# Patient Record
Sex: MALE | URBAN - METROPOLITAN AREA
[De-identification: names, ages, dates, MRNs, and addresses within clinical notes are randomized per-mention and may not be internally consistent; named-entity substitution may affect disease eponyms.]

---

## 2023-05-21 ENCOUNTER — EMERGENCY (EMERGENCY)
Facility: HOSPITAL | Age: 18
LOS: 1 days | Discharge: HOME | End: 2023-05-21
Admitting: EMERGENCY MEDICINE
Payer: MEDICAID

## 2023-05-21 VITALS
SYSTOLIC BLOOD PRESSURE: 138 MMHG | HEART RATE: 80 BPM | OXYGEN SATURATION: 98 % | RESPIRATION RATE: 20 BRPM | TEMPERATURE: 98 F | DIASTOLIC BLOOD PRESSURE: 74 MMHG | HEIGHT: 74 IN | WEIGHT: 220.02 LBS

## 2023-05-21 DIAGNOSIS — K08.89 OTHER SPECIFIED DISORDERS OF TEETH AND SUPPORTING STRUCTURES: ICD-10-CM

## 2023-05-21 PROCEDURE — 99283 EMERGENCY DEPT VISIT LOW MDM: CPT

## 2023-05-21 NOTE — ED ADULT NURSE NOTE - NSFALLUNIVINTERV_ED_ALL_ED
Bed/Stretcher in lowest position, wheels locked, appropriate side rails in place/Call bell, personal items and telephone in reach/Instruct patient to call for assistance before getting out of bed/chair/stretcher/Non-slip footwear applied when patient is off stretcher/[place] to call system/Physically safe environment - no spills, clutter or unnecessary equipment/Purposeful proactive rounding/Room/bathroom lighting operational, light cord in reach

## 2023-05-21 NOTE — ED ADULT TRIAGE NOTE - CHIEF COMPLAINT QUOTE
Pt walked into ER c/o dental pain for the past year worsening a few months ago. Pt reports pain when eating or drinking. No further associated complaints at triage.

## 2023-05-21 NOTE — ED ADULT NURSE NOTE - NSSEPSISSUSPECTED_ED_A_ED
HPI Comments: Gold Matthews is a 23 y.o. male with PMHx significant for DM, thyroid disease, who presents ambulatory to ED [facility] ED with cc of acute onset periumbilical abdominal pain yesterday. Patient notes a \"lump\" to his periumbilical region. Exacerbating factors include positional changes. Patient reports an episode of nausea and diarrhea earlier today. He states his last BM was today. Per mother, patient stopped seeing his PCP because he is uninsured. Patient denies any vomiting, hematuria, dysuria, fever, or chills. PCP: Estanislado Nyhan, MD      Social History: (+) Tobacco, (-) EtOH, (-) Illicit Drugs       There are no other complaints, changes, or physical findings at this time. Written by Orvel Hashimoto, ED Scribe, as dictated by Blaze Johnson MD.       The history is provided by the patient and a parent. No  was used. Past Medical History:   Diagnosis Date    ADHD (attention deficit hyperactivity disorder) 9/30/2011    Depression 9/30/2011    Diabetes mellitus ([address]     Endocrine disease     thyroid    Obesity     Thyroid activity decreased        Past Surgical History:   Procedure Laterality Date    HX OTHER SURGICAL  2002    Had neck surgery to remove a \"piece of cartilage. \"          Family History:   Problem Relation Age of Onset    Diabetes Paternal Grandmother    [address] [facility] Arthritis-rheumatoid Mother     Thyroid Disease Mother     Hypertension Father        Social History     Social History    Marital status: SINGLE     Spouse name: N/A    Number of children: N/A    Years of education: N/A     Occupational History    Not on file.      Social History Main Topics    Smoking status: Current Some Day Smoker     Packs/day: 0.50     Years: 2.00     Types: Cigarettes     Start date: 7/15/2013    Smokeless tobacco: Never Used    Alcohol use No    Drug use: No    Sexual activity: No     Other Topics Concern    Not on file     Social History Narrative ALLERGIES: Review of patient's allergies indicates no known allergies. Review of Systems   Constitutional: Negative for chills, fatigue and fever. HENT: Negative for congestion, rhinorrhea and sore throat. Eyes: Negative for pain, discharge and visual disturbance. Respiratory: Negative for cough, chest tightness, shortness of breath and wheezing. Cardiovascular: Negative for chest pain, palpitations and leg swelling. Gastrointestinal: Positive for abdominal pain, diarrhea and nausea. Negative for constipation and vomiting. Genitourinary: Negative for dysuria, frequency and hematuria. Musculoskeletal: Negative for arthralgias, back pain and myalgias. Skin: Negative for rash. Neurological: Negative for dizziness, weakness, light-headedness and headaches. Psychiatric/Behavioral: Negative. Vitals:    05/08/17 2022   BP: 108/83   Pulse: 67   Resp: 18   Temp: 98.2 °F (36.8 °C)   SpO2: 99%   Weight: 155.8 kg (343 lb 7.6 oz)   Height: 6' 3\" (1.905 m)            Physical Exam   Constitutional: He is oriented to person, place, and time. He appears well-developed and well-nourished. No distress. HENT:   Head: Normocephalic and atraumatic. Eyes: EOM are normal. Right eye exhibits no discharge. Left eye exhibits no discharge. No scleral icterus. Neck: Normal range of motion. Neck supple. No tracheal deviation present. Cardiovascular: Normal rate, regular rhythm, normal heart sounds and intact distal pulses. Exam reveals no gallop and no friction rub. No murmur heard. Pulmonary/Chest: Effort normal and breath sounds normal. No respiratory distress. He has no wheezes. He has no rales. Abdominal: Soft. He exhibits no distension. There is tenderness. There is no rebound and no guarding. No hernia noted  Mild periumbilical tenderness   Musculoskeletal: Normal range of motion. He exhibits no edema. Lymphadenopathy:     He has no cervical adenopathy.    Neurological: He is alert and oriented to person, place, and time. Skin: Skin is warm and dry. No rash noted. Psychiatric: He has a normal mood and affect. Nursing note and vitals reviewed. MDM  Number of Diagnoses or Management Options  Periumbilical abdominal pain:   Diagnosis management comments:     Patient presents to ED with abdominal pain. Abdominal exam relatively benign - mild periumbilical tenderness on exam, no other focal tenderness noted. CBC, CMP and lipase WNL. Patient unable to provide urine sample and reports he is ready to be discharged. He is tolerating po. Overall low suspicion for acute intraabdominal process including appendicitis. Discussed results, prescriptions and follow up plan with patient. Provided customary return to ED instructions. Patient expressed understanding. Yue Juarez MD           Amount and/or Complexity of Data Reviewed  Clinical lab tests: ordered and reviewed  Obtain history from someone other than the patient: yes (mother)  Review and summarize past medical records: yes    Patient Progress  Patient progress: stable    ED Course       Procedures    LABORATORY TESTS:  Recent Results (from the past 12 hour(s))   CBC WITH AUTOMATED DIFF    Collection Time: 05/08/17  8:39 PM   Result Value Ref Range    WBC 11.1 4.1 - 11.1 K/uL    RBC 5.23 4. 10 - 5.70 M/uL    HGB 15.2 12.1 - 17.0 g/dL    HCT 46.3 36.6 - 50.3 %    MCV 88.5 80.0 - 99.0 FL    MCH 29.1 26.0 - 34.0 PG    MCHC 32.8 30.0 - 36.5 g/dL    RDW 14.1 11.5 - 14.5 %    PLATELET 889 694 - 508 K/uL    NEUTROPHILS 61 32 - 75 %    LYMPHOCYTES 29 12 - 49 %    MONOCYTES 6 5 - 13 %    EOSINOPHILS 4 0 - 7 %    BASOPHILS 0 0 - 1 %    ABS. NEUTROPHILS 6.7 1.8 - 8.0 K/UL    ABS. LYMPHOCYTES 3.2 0.8 - 3.5 K/UL    ABS. MONOCYTES 0.7 0.0 - 1.0 K/UL    ABS. EOSINOPHILS 0.5 (H) 0.0 - 0.4 K/UL    ABS.  BASOPHILS 0.0 0.0 - 0.1 K/UL   METABOLIC PANEL, COMPREHENSIVE    Collection Time: 05/08/17  8:39 PM   Result Value Ref Range    Sodium 143 136 - 145 mmol/L    Potassium 3.8 3.5 - 5.1 mmol/L    Chloride 109 (H) 97 - 108 mmol/L    CO2 27 21 - 32 mmol/L    Anion gap 7 5 - 15 mmol/L    Glucose 89 65 - 100 mg/dL    BUN 7 6 - 20 MG/DL    Creatinine 1.04 0.70 - 1.30 MG/DL    BUN/Creatinine ratio 7 (L) 12 - 20      GFR est AA >60 >60 ml/min/1.73m2    GFR est non-AA >60 >60 ml/min/1.73m2    Calcium 8.7 8.5 - 10.1 MG/DL    Bilirubin, total 0.3 0.2 - 1.0 MG/DL    ALT (SGPT) 22 12 - 78 U/L    AST (SGOT) 11 (L) 15 - 37 U/L    Alk. phosphatase 109 60 - 330 U/L    Protein, total 7.3 6.4 - 8.2 g/dL    Albumin 3.5 3.5 - 5.0 g/dL    Globulin 3.8 2.0 - 4.0 g/dL    A-G Ratio 0.9 (L) 1.1 - 2.2     LIPASE    Collection Time: 05/08/17  8:39 PM   Result Value Ref Range    Lipase 101 73 - 393 U/L       MEDICATIONS GIVEN:  Medications   ondansetron (ZOFRAN ODT) tablet 4 mg (4 mg Oral Given 5/8/17 7904)       IMPRESSION:  1. Periumbilical abdominal pain        PLAN:  1. Discharge Medication List as of 5/8/2017 11:58 PM        2. Follow-up Information     Follow up With Details Comments Contact Info    Shant Elizaebth MD In 2 days  [address]  [phone]      [facility] EMERGENCY DEPT  As needed, If symptoms worsen [address] [place] [address] Drive  [address] [facility]  [phone]          Return to ED if worse     Discharge Note:  11:58 PM  The patient has been re-evaluated and is ready for discharge. Reviewed available results with patient. Counseled patient on diagnosis and care plan. Patient has expressed understanding, and all questions have been answered. Patient agrees with plan and agrees to follow up as recommended, or to return to the ED if their symptoms worsen. Discharge instructions have been provided and explained to the patient, along with reasons to return to the ED.   Written by Gloria Flynn, ED Scribe, as dictated by Melba Cadena MD.    This note is prepared by Gloria Flynn, acting as Scribe for Kateryna Blandon MD.    Kateryna Blandon MD: The scribe's documentation has been prepared under my direction and personally reviewed by me in its entirety. I confirm that the note above accurately reflects all work, treatment, procedures, and medical decision making performed by me. No

## 2023-05-22 RX ORDER — IBUPROFEN 200 MG
1 TABLET ORAL
Qty: 21 | Refills: 0
Start: 2023-05-22 | End: 2023-05-28

## 2023-05-22 RX ORDER — IBUPROFEN 200 MG
800 TABLET ORAL ONCE
Refills: 0 | Status: COMPLETED | OUTPATIENT
Start: 2023-05-22 | End: 2023-05-22

## 2023-05-22 RX ORDER — AMOXICILLIN 250 MG/5ML
500 SUSPENSION, RECONSTITUTED, ORAL (ML) ORAL ONCE
Refills: 0 | Status: COMPLETED | OUTPATIENT
Start: 2023-05-22 | End: 2023-05-22

## 2023-05-22 RX ORDER — AMOXICILLIN 250 MG/5ML
1 SUSPENSION, RECONSTITUTED, ORAL (ML) ORAL
Qty: 14 | Refills: 0
Start: 2023-05-22 | End: 2023-05-28

## 2023-05-22 RX ADMIN — Medication 800 MILLIGRAM(S): at 00:12

## 2023-05-22 RX ADMIN — Medication 500 MILLIGRAM(S): at 00:12

## 2023-05-22 NOTE — ED PROVIDER NOTE - NSFOLLOWUPINSTRUCTIONS_ED_ALL_ED_FT
Take Ibuprofen 600mg every 6-8 hours as needed for pain, take with food, and in addition you may take Tylenol 500 mg every 6-8 hours as needed for pain      Please go to [facility] walk in clinic  [facility] is located at [facility] [address] [place] [address] ([facility]) in [place].  MONDAY THROUGH THURSDAY:  8:30 am - 8:00 pm (on a first-come, first-served basis)  FRIDAY AND SATURDAY  8:30 am - 4:00 pm (on a first-come, first-served basis)     Return for any concerning or worsening symptoms.

## 2023-05-22 NOTE — ED PROVIDER NOTE - PHYSICAL EXAMINATION
CONSTITUTIONAL: Well-appearing; well-nourished; in no apparent distress.   	HEAD: Normocephalic; atraumatic.   	EYES:  conjunctiva and sclera clear  	ENT: left lower molar deep cavity, no surrounding erythema. no gum swelling or signs of abscess. airway patent. no exudates. uvula midline.   	NEURO: A & O x 3; face symmetric; grossly unremarkable.   PSYCHOLOGICAL: The patient’s mood and manner are appropriate.

## 2023-05-22 NOTE — ED PROVIDER NOTE - CLINICAL SUMMARY MEDICAL DECISION MAKING FREE TEXT BOX
toothache deep cavity no signs of gum abscess or active infection, patient requesting antibiotics even though not indicated. I was attempting to e prescribe but patient left prior to final registration so e submit was not successful. before patient left, I instructed him to follow up with [facility] dental walk in clinic.

## 2023-05-22 NOTE — ED PROVIDER NOTE - PATIENT PORTAL LINK FT
You can access the FollowMyHealth Patient Portal offered by [facility] by registering at the following website: [email or web address]. By joining [facility]’s FollowMyHealth portal, you will also be able to view your health information using other applications (apps) compatible with our system.